# Patient Record
Sex: MALE | Race: WHITE | NOT HISPANIC OR LATINO | Employment: FULL TIME | URBAN - METROPOLITAN AREA
[De-identification: names, ages, dates, MRNs, and addresses within clinical notes are randomized per-mention and may not be internally consistent; named-entity substitution may affect disease eponyms.]

---

## 2018-09-19 ENCOUNTER — OFFICE VISIT (OUTPATIENT)
Dept: MEDICAL GROUP | Facility: PHYSICIAN GROUP | Age: 33
End: 2018-09-19
Payer: COMMERCIAL

## 2018-09-19 VITALS
DIASTOLIC BLOOD PRESSURE: 72 MMHG | OXYGEN SATURATION: 96 % | WEIGHT: 228.4 LBS | BODY MASS INDEX: 30.27 KG/M2 | HEART RATE: 85 BPM | TEMPERATURE: 97.3 F | HEIGHT: 73 IN | SYSTOLIC BLOOD PRESSURE: 112 MMHG | RESPIRATION RATE: 18 BRPM

## 2018-09-19 DIAGNOSIS — F17.220 CHEWING TOBACCO NICOTINE DEPENDENCE WITHOUT COMPLICATION: ICD-10-CM

## 2018-09-19 DIAGNOSIS — Z76.89 ESTABLISHING CARE WITH NEW DOCTOR, ENCOUNTER FOR: ICD-10-CM

## 2018-09-19 DIAGNOSIS — E66.9 OBESITY (BMI 30-39.9): ICD-10-CM

## 2018-09-19 PROCEDURE — 99203 OFFICE O/P NEW LOW 30 MIN: CPT | Performed by: NURSE PRACTITIONER

## 2018-09-19 ASSESSMENT — PATIENT HEALTH QUESTIONNAIRE - PHQ9: CLINICAL INTERPRETATION OF PHQ2 SCORE: 0

## 2018-09-19 NOTE — PROGRESS NOTES
Chief Complaint   Patient presents with   • Annual Exam        Farrukh Bryant is a 33 y.o. male here today to establish care and to discuss the evaluation and management of:    HPI:      Patient is here to establish care, he does not have a prior PCP.  Patient reports he is here as his wife wanted him to get labs done and be seen to establish care with a healthcare provider.  She is not here with him today.  He works for [employer].  He has 1 son, 6-month-old.      Chewing tobacco nicotine dependence without complication  This is a chronic problem.  Patient reports he has used chewing tobacco daily since he was about age 13 or 14 years.  He is not ready to quit.  He does see a dentist up to 4 times a year.  Denies any current sores or lesions in his mouth.      Current medicines (including changes today)  No current outpatient prescriptions on file.     No current facility-administered medications for this visit.        He  has a past medical history of Allergy.    He  has a past surgical history that includes knee arthroscopy.     Social History   Substance Use Topics   • Smoking status: Light Tobacco Smoker   • Smokeless tobacco: Current User     Types: Chew      Comment: very infrequent smoking, started chew age 13   • Alcohol use 0.6 - 1.2 oz/week     1 - 2 Cans of beer per week      Comment: 1-2 beers per day, sometimes more        Social History     Social History Narrative   • No narrative on file       Family History   Problem Relation Age of Onset   • No Known Problems Father        Family Status   Relation Status   • Mo (Not Specified)        unknown medical history   • Fa Alive       ROS   Constitutional: Denies fever, chills, or sweats  Eyes: negative for visual blurring, double vision, eye pain, floaters and discharge from eyes  ENT: negative for tinnitus, vertigo, frequent URI's, sinus trouble, persistent sore throat  Respiratory: negative for persistent cough, hemoptysis, dyspnea, wheezing  Cardiovascular:  "negative for palpitations, chest pain, or peripheral edema.  Gastrointestinal: negative for poor appetite, dysphagia, nausea, heartburn, abdominal pain, hemorrhoids, constipation or diarrhea  Genitourinary: negative for dysuria.   Musculoskeletal: negative for joint swelling and muscle pain/ soreness  Skin: negative for rash, scaling, hair or nail changes.  Neurologic: negative for migraine headaches, involuntary movements or tremor  Psychiatric: Positive for daily alcohol use, 1-2 beers daily.  Positive for MJ use. Negative for other illegal drug usage, sleep disturbance, anxiety, depression  Hematologic/Lymphatic/Immunologic: negative for unusual bruising, swollen glands  Endocrine: negative for temperature intolerance, polydipsia, polyuria, unintentional weight changes.        Objective:     Blood pressure 112/72, pulse 85, temperature 36.3 °C (97.3 °F), temperature source Temporal, resp. rate 18, height 1.854 m (6' 1\"), weight 103.6 kg (228 lb 6.4 oz), SpO2 96 %. Body mass index is 30.13 kg/m².    Physical Exam:   Constitutional: Alert, no distress.  Eye: Equal, round and reactive, conjunctiva clear, lids normal.  ENMT: Lips without lesions, good dentition, oropharynx clear. He does have chew in his mouth at time of exam. TM's normal without erythema, canals patent. Normal appearance of external nose, no drainage noted.   Neck: Trachea midline, no masses, no thyromegaly. No cervical or supraclavicular lymphadenopathy.   Respiratory: Unlabored respiratory effort, lungs clear to auscultation, no wheezes, no ronchi.  Cardiovascular: Normal S1, S2, no murmur, no edema. Capillary refill < 2 seconds in UE bilaterally.   Abdomen: Soft, non-tender, no masses, no hepatosplenomegaly. Normal bowel sounds.   Skin: Warm, dry, good turgor, no rashes in visible areas.  Neuro: DTR 2+ LE, CN II-XII grossly intact, normal sensation in extremities.   Psych: Alert and oriented x3, normal affect and mood.      Assessment and Plan: "   The following treatment plan was discussed    1. Establishing care with new doctor, encounter for    2. Chewing tobacco nicotine dependence without complication  Discussed with patient risks.  Patient is seeing a dentist currently.  Advised patient of resources if he would like to choose to quit chewing.  - CBC WITH DIFFERENTIAL; Future    3. Obesity (BMI 30-39.9)  Advised to complete fasting labs, will plan to go over those results at a future appointment.  - TSH; Future  - LIPID PROFILE; Future  - COMP METABOLIC PANEL; Future  - Patient identified as having weight management issue.  Appropriate orders and counseling given.      Reviewed risks and benefits of treatment plan. Patient verbally agrees to plan of care.     Records requested.    Followup: Return in about 3 weeks (around 10/10/2018) for Lab follow up, Annual Exam.    HANNAH Ya.     PLEASE NOTE: This dictation was created using voice recognition software. I have made every reasonable attempt to correct obvious errors, but I expect that there may be errors of grammar and possibly content that I did not discover prior finalizing this note.

## 2018-09-19 NOTE — ASSESSMENT & PLAN NOTE
This is a chronic problem.  Patient reports he has used chewing tobacco daily since he was about age 13 or 14 years.  He is not ready to quit.  He does see a dentist up to 4 times a year.  Denies any current sores or lesions in his mouth.

## 2018-09-19 NOTE — PATIENT INSTRUCTIONS
Your medical care was provided today by: CHELSEY Romeo    Thank You for the opportunity to serve you.    You may receive a brief survey in the mail shortly regarding your visit today. Please take a few moments to complete the survey and return it; no postage is necessary. We are working to serve our patient population better, improve customer service and our patients overall experience and your input can help us to accomplish this. We thank you for your help and for the opportunity to serve you today and in the future.     Special Instructions:  Always call 9-1-1 immediately if you develop a life threatening emergency.    Unless told otherwise please take all medications as directed and complete prescription therapies.     Watch for the following signs that require additional evaluation: progressive lethargy or unresponsiveness, localized pain (chest, abdomen), shortness of breath, painful breathing, progressive vomiting with weakness, bloody stools, or new rash.     If you are prescribed pain medication or any other medication that is sedating, do not take medication before or while operating a vehicle or heavy machinery or equipment due to potential side effects such as drowsiness and/or dizziness.

## 2023-06-29 ENCOUNTER — OFFICE VISIT (OUTPATIENT)
Dept: MEDICAL GROUP | Facility: PHYSICIAN GROUP | Age: 38
End: 2023-06-29
Payer: COMMERCIAL

## 2023-06-29 VITALS
HEART RATE: 72 BPM | HEIGHT: 73 IN | BODY MASS INDEX: 29.57 KG/M2 | RESPIRATION RATE: 12 BRPM | WEIGHT: 223.11 LBS | DIASTOLIC BLOOD PRESSURE: 78 MMHG | TEMPERATURE: 96.8 F | OXYGEN SATURATION: 98 % | SYSTOLIC BLOOD PRESSURE: 110 MMHG

## 2023-06-29 DIAGNOSIS — F17.220 CHEWING TOBACCO NICOTINE DEPENDENCE WITHOUT COMPLICATION: ICD-10-CM

## 2023-06-29 DIAGNOSIS — H61.22 IMPACTED CERUMEN OF LEFT EAR: ICD-10-CM

## 2023-06-29 DIAGNOSIS — Z00.00 HEALTHCARE MAINTENANCE: ICD-10-CM

## 2023-06-29 DIAGNOSIS — R68.82 DECREASED LIBIDO: ICD-10-CM

## 2023-06-29 DIAGNOSIS — E66.3 OVERWEIGHT (BMI 25.0-29.9): ICD-10-CM

## 2023-06-29 DIAGNOSIS — Z11.59 NEED FOR HEPATITIS C SCREENING TEST: ICD-10-CM

## 2023-06-29 PROCEDURE — 99385 PREV VISIT NEW AGE 18-39: CPT | Performed by: STUDENT IN AN ORGANIZED HEALTH CARE EDUCATION/TRAINING PROGRAM

## 2023-06-29 PROCEDURE — 3074F SYST BP LT 130 MM HG: CPT | Performed by: STUDENT IN AN ORGANIZED HEALTH CARE EDUCATION/TRAINING PROGRAM

## 2023-06-29 PROCEDURE — 3078F DIAST BP <80 MM HG: CPT | Performed by: STUDENT IN AN ORGANIZED HEALTH CARE EDUCATION/TRAINING PROGRAM

## 2023-06-29 PROCEDURE — 99213 OFFICE O/P EST LOW 20 MIN: CPT | Mod: 25 | Performed by: STUDENT IN AN ORGANIZED HEALTH CARE EDUCATION/TRAINING PROGRAM

## 2023-06-29 ASSESSMENT — PATIENT HEALTH QUESTIONNAIRE - PHQ9: CLINICAL INTERPRETATION OF PHQ2 SCORE: 0

## 2023-06-29 ASSESSMENT — ENCOUNTER SYMPTOMS
NERVOUS/ANXIOUS: 0
DEPRESSION: 0
PALPITATIONS: 0
ABDOMINAL PAIN: 0
DIZZINESS: 0
HEARTBURN: 0
FEVER: 0
ORTHOPNEA: 0
INSOMNIA: 0
HEADACHES: 0
CHILLS: 0
SHORTNESS OF BREATH: 0
WHEEZING: 0
FOCAL WEAKNESS: 0
COUGH: 0

## 2023-06-29 NOTE — PROGRESS NOTES
Subjective:   HISTORY OF THE PRESENT ILLNESS: Patient is a 37 y.o. male here today to establish care.       Problem   Decreased Libido    Decreased sex drive last couple months.   Reports he is also fatigue.   Denies depression or anxiety.     Reports drinks about 20 cans of beer in a week, occasional marijuana. Cutting down on chew tobacco.   Denies cold intolerance.     He works for [employer].   Has good sleep.      Impacted Cerumen of Left Ear   Chewing Tobacco Nicotine Dependence Without Complication        No current [facility]-ordered outpatient medications on file.     No current [facility]-ordered facility-administered medications on file.       Review of systems:  Review of Systems   Constitutional:  Negative for chills and fever.   Respiratory:  Negative for cough, shortness of breath and wheezing.    Cardiovascular:  Negative for chest pain, palpitations, orthopnea and leg swelling.   Gastrointestinal:  Negative for abdominal pain, heartburn and melena.   Genitourinary:  Negative for dysuria and frequency.   Musculoskeletal:  Negative for joint pain.   Neurological:  Negative for dizziness, focal weakness and headaches.   Psychiatric/Behavioral:  Negative for depression and suicidal ideas. The patient is not nervous/anxious and does not have insomnia.          Past Medical History:   Diagnosis Date    Allergy      Past Surgical History:   Procedure Laterality Date    KNEE ARTHROSCOPY      right ACL      Social History     Tobacco Use    Smoking status: Former     Types: Cigarettes    Smokeless tobacco: Current     Types: Chew    Tobacco comments:     very infrequent smoking, started chew age 13   Vaping Use    Vaping Use: Never used   Substance Use Topics    Alcohol use: Yes     Alcohol/week: 0.6 - 1.2 oz     Types: 1 - 2 Cans of beer per week     Comment: 1-2 beers per day, sometimes more     Drug use: Yes     Types: Marijuana     Comment: weekly      Family History   Problem Relation Age of Onset    No Known Problems  "Mother     No Known Problems Father      No current outpatient medications on file.     No current facility-administered medications for this visit.       Allergies:  No Known Allergies    Allergies, past medical history, past surgical history, family history, social history reviewed and updated.    Objective:    /78 (BP Location: Left arm, Patient Position: Sitting, BP Cuff Size: Adult)   Pulse 72   Temp 36 °C (96.8 °F) (Temporal)   Resp 12   Ht 1.854 m (6' 1\")   Wt 101 kg (223 lb 1.7 oz)   SpO2 98%   BMI 29.44 kg/m²    Body mass index is 29.44 kg/m².    Physical exam:  General: Normal appearance, no acute distress, not ill-appearing  HEENT: EOM intact, conjunctiva normal limits, negative right/left eye discharge.  Sclera anicteric, right TM within normal limits, left TM cerumen impaction  Cardiovascular: Normal rate and rhythm, no murmurs  Pulmonary: No respiratory distress, no wheezing, no rales, breath sounds normal.  Musculoskeletal: No edema bilaterally  Skin: Warm, dry, no lesions  Neurological: No focal deficits, normal gait  Psychiatric: Mood within normal limits    Assessment/Plan:    Patient here for a preventive medicine visit today and to establish care.  -Reviewed all past medical history, family history, social history    -Diet and exercise appropriate counseling given  -Social determinants of health reviewed  -Tobacco, alcohol, recreational drug use: chew tobacco  -Occupation: [employer]  -Cholesterol screening: ordered  -Diabetes screening: ordered    Immunizations/Infectious disease:  STI screening:declines  Safe sex practices discusssed  HIV screening: declines   Immunizations: declines pna    Cancer screenings:  Colorectal cancer screening: no fam hx of colon cancer     Problem List Items Addressed This Visit       Chewing tobacco nicotine dependence without complication     Cutting down on his own  Advised to rtc if needing nicotine patches  Advised to follow up with dentist twice per " year         Decreased libido     TSH, testosterone ordered  Advised to keep a healthy diet with fresh fruits vegetables, meats.  Stay away from alcohol/beer consumption.  Exercise daily including resistance training.    Follow-up 2 months         Relevant Orders    VITAMIN D,25 HYDROXY (DEFICIENCY)    TSH WITH REFLEX TO FT4    TESTOSTERONE SERUM    Impacted cerumen of left ear     Left-sided cerumen impaction irrigated today by medical assistant.  Patient tolerated procedure well, TM visualized after irrigation within normal limits.          Other Visit Diagnoses       Healthcare maintenance        Relevant Orders    Comp Metabolic Panel    CBC WITHOUT DIFFERENTIAL    Need for hepatitis C screening test        Relevant Orders    HEP C VIRUS ANTIBODY    Overweight (BMI 25.0-29.9)        Relevant Orders    HEMOGLOBIN A1C           Patient Counseling:  --Discussed moderation in caloric intake, sufficient fresh fruits/vegetables, fiber, iron  --Discussed brushing, flossing, and dental visits.   --Encouraged regular exercise.   --Discussed tobacco, alcohol, or other drug use.  --Discussed sexually transmitted infections, partner selection, use of condoms, avoidance of unintended pregnancy and contraceptive alternatives.  --Injury prevention: Discussed safety belts, safety helmets, smoke detector, etc.      Return in about 2 months (around 8/29/2023) for symptoms.

## 2023-06-29 NOTE — ASSESSMENT & PLAN NOTE
TSH, testosterone ordered  Advised to keep a healthy diet with fresh fruits vegetables, meats.  Stay away from alcohol/beer consumption.  Exercise daily including resistance training.    Follow-up 2 months

## 2023-06-29 NOTE — ASSESSMENT & PLAN NOTE
Cutting down on his own  Advised to rtc if needing nicotine patches  Advised to follow up with dentist twice per year

## 2023-06-29 NOTE — ASSESSMENT & PLAN NOTE
Left-sided cerumen impaction irrigated today by medical assistant.  Patient tolerated procedure well, TM visualized after irrigation within normal limits.

## 2023-07-03 LAB — HBA1C MFR BLD: 5.3 % (ref ?–5.8)

## 2023-07-05 ENCOUNTER — TELEPHONE (OUTPATIENT)
Dept: MEDICAL GROUP | Facility: PHYSICIAN GROUP | Age: 38
End: 2023-07-05
Payer: COMMERCIAL

## 2023-07-05 NOTE — TELEPHONE ENCOUNTER
Phone Number Called: [phone]    Call outcome: Spoke to patient regarding message below.    Message:Labs reviewed today. Testosterone levels are normal however Vitamin D levels are very low. This could be contributing to your symptoms. I recommend taking 2000units of Vitamin D daily that you can get over the counter. One of your liver enzymes was slightly elevated I think we should check this again in 6m to 1 year along with vitamin D levels, in the meantime I would cut back on alcohol.      Any questions please let me know or schedule an appt.      Fe Sidhu DO

## 2023-07-05 NOTE — TELEPHONE ENCOUNTER
----- Message from Carrie Sidhu D.O. sent at 7/5/2023  7:39 AM PDT -----  Labs reviewed today. Testosterone levels are normal however Vitamin D levels are very low. This could be contributing to your symptoms. I recommend taking 2000units of Vitamin D daily that you can get over the counter. One of your liver enzymes was slightly elevated I think we should check this again in 6m to 1 year along with vitamin D levels, in the meantime I would cut back on alcohol.     Any questions please let me know or schedule an appt.     Fe Sidhu DO

## 2023-09-20 ENCOUNTER — OFFICE VISIT (OUTPATIENT)
Dept: MEDICAL GROUP | Facility: PHYSICIAN GROUP | Age: 38
End: 2023-09-20
Payer: COMMERCIAL

## 2023-09-20 VITALS
OXYGEN SATURATION: 95 % | HEIGHT: 73 IN | SYSTOLIC BLOOD PRESSURE: 108 MMHG | DIASTOLIC BLOOD PRESSURE: 74 MMHG | WEIGHT: 229.28 LBS | BODY MASS INDEX: 30.39 KG/M2 | HEART RATE: 60 BPM | RESPIRATION RATE: 12 BRPM | TEMPERATURE: 97 F

## 2023-09-20 DIAGNOSIS — Z78.9 HEAVY DRINKER OF ALCOHOL: ICD-10-CM

## 2023-09-20 DIAGNOSIS — R74.8 ELEVATED LIVER ENZYMES: ICD-10-CM

## 2023-09-20 DIAGNOSIS — F17.220 CHEWING TOBACCO NICOTINE DEPENDENCE WITHOUT COMPLICATION: ICD-10-CM

## 2023-09-20 PROBLEM — R68.82 DECREASED LIBIDO: Status: RESOLVED | Noted: 2023-06-29 | Resolved: 2023-09-20

## 2023-09-20 PROBLEM — H61.22 IMPACTED CERUMEN OF LEFT EAR: Status: RESOLVED | Noted: 2023-06-29 | Resolved: 2023-09-20

## 2023-09-20 PROCEDURE — 99213 OFFICE O/P EST LOW 20 MIN: CPT | Performed by: STUDENT IN AN ORGANIZED HEALTH CARE EDUCATION/TRAINING PROGRAM

## 2023-09-20 PROCEDURE — 3074F SYST BP LT 130 MM HG: CPT | Performed by: STUDENT IN AN ORGANIZED HEALTH CARE EDUCATION/TRAINING PROGRAM

## 2023-09-20 PROCEDURE — 3078F DIAST BP <80 MM HG: CPT | Performed by: STUDENT IN AN ORGANIZED HEALTH CARE EDUCATION/TRAINING PROGRAM

## 2023-09-20 ASSESSMENT — ENCOUNTER SYMPTOMS
FEVER: 0
HEADACHES: 0
ABDOMINAL PAIN: 0
ORTHOPNEA: 0
DIZZINESS: 0
WHEEZING: 0
COUGH: 0
SHORTNESS OF BREATH: 0
CHILLS: 0
PALPITATIONS: 0
FOCAL WEAKNESS: 0

## 2023-09-20 NOTE — PROGRESS NOTES
HISTORY OF PRESENT ILLNESS: Farrukh is a pleasant 38 y.o. male, established patient who presents today to discuss medical problems as listed below:    Problem   Elevated Liver Enzymes   Heavy Drinker of Alcohol    Patient reports a history of heavy drinking.  Reports that within the last 2 months he has cut back significantly since then.  Reports his symptoms of low libido has improved as well     Chewing Tobacco Nicotine Dependence Without Complication   Decreased Libido (Resolved)    Decreased sex drive last couple months.   Reports he is also fatigue.   Denies depression or anxiety.     Reports drinks about 20 cans of beer in a week, occasional marijuana. Cutting down on chew tobacco.   Denies cold intolerance.     He works for [employer].   Has good sleep.      Impacted Cerumen of Left Ear (Resolved)        No current [facility]-ordered outpatient medications on file.     No current [facility]-ordered facility-administered medications on file.       Review of systems:  Review of Systems   Constitutional:  Negative for chills and fever.   Respiratory:  Negative for cough, shortness of breath and wheezing.    Cardiovascular:  Negative for chest pain, palpitations, orthopnea and leg swelling.   Gastrointestinal:  Negative for abdominal pain and melena.   Genitourinary:  Negative for dysuria.   Musculoskeletal:  Negative for joint pain.   Neurological:  Negative for dizziness, focal weakness and headaches.         Patient Active Problem List    Diagnosis Date Noted    Elevated liver enzymes 09/20/2023    Heavy drinker of alcohol 09/20/2023    Chewing tobacco nicotine dependence without complication 09/19/2018    Obesity (BMI 30-39.9) 09/19/2018     Past Surgical History:   Procedure Laterality Date    KNEE ARTHROSCOPY      right ACL      Social History     Tobacco Use    Smoking status: Former     Types: Cigarettes    Smokeless tobacco: Current     Types: Chew    Tobacco comments:     very infrequent smoking, started chew age 13  "  Vaping Use    Vaping Use: Never used   Substance Use Topics    Alcohol use: Yes     Alcohol/week: 0.6 - 1.2 oz     Types: 1 - 2 Cans of beer per week     Comment: 1-2 beers per day, sometimes more     Drug use: Yes     Types: Marijuana     Comment: weekly      Family History   Problem Relation Age of Onset    No Known Problems Mother     No Known Problems Father      No current outpatient medications on file.     No current facility-administered medications for this visit.       Allergies:  No Known Allergies    Allergies, past medical history, past surgical history, family history, social history reviewed and updated.    Objective:    /74   Pulse 60   Temp 36.1 °C (97 °F) (Temporal)   Resp 12   Ht 1.854 m (6' 1\")   Wt 104 kg (229 lb 4.5 oz)   SpO2 95%   BMI 30.25 kg/m²    Body mass index is 30.25 kg/m².    Physical exam:  General: Normal appearance, no acute distress, not ill-appearing  HEENT: EOM intact, conjunctiva normal limits, negative right/left eye discharge.  Sclera anicteric  Cardiovascular: Normal rate and rhythm, no murmurs  Pulmonary: No respiratory distress, no wheezing, no rales, breath sounds normal.  Abdomen: Bowel sounds normal, flat, soft.  Musculoskeletal: No edema bilaterally  Skin: Warm, dry, no lesions  Neurological: No focal deficits, normal gait  Psychiatric: Mood within normal limits    Assessment/Plan:    Problem List Items Addressed This Visit       Chewing tobacco nicotine dependence without complication     Counseled on cessation         Elevated liver enzymes    Relevant Orders    US-RUQ    Heavy drinker of alcohol     Elevated AST on last set of labs were we will order a right upper quadrant ultrasound to assess.               Return in about 1 year (around 9/20/2024).   "

## 2023-09-20 NOTE — ASSESSMENT & PLAN NOTE
Elevated AST on last set of labs were we will order a right upper quadrant ultrasound to assess.

## 2024-06-28 LAB — HBA1C MFR BLD: 5.4 % (ref ?–5.8)

## 2024-07-06 DIAGNOSIS — R79.89 LOW TESTOSTERONE IN MALE: ICD-10-CM

## 2024-07-25 ENCOUNTER — APPOINTMENT (OUTPATIENT)
Dept: MEDICAL GROUP | Facility: PHYSICIAN GROUP | Age: 39
End: 2024-07-25
Payer: COMMERCIAL

## 2024-07-25 VITALS
HEIGHT: 73 IN | RESPIRATION RATE: 16 BRPM | TEMPERATURE: 96.8 F | HEART RATE: 60 BPM | OXYGEN SATURATION: 97 % | DIASTOLIC BLOOD PRESSURE: 84 MMHG | SYSTOLIC BLOOD PRESSURE: 118 MMHG | BODY MASS INDEX: 31.26 KG/M2 | WEIGHT: 235.89 LBS

## 2024-07-25 DIAGNOSIS — R74.8 ELEVATED LIVER ENZYMES: ICD-10-CM

## 2024-07-25 DIAGNOSIS — E55.9 VITAMIN D DEFICIENCY: ICD-10-CM

## 2024-07-25 DIAGNOSIS — E66.9 OBESITY (BMI 30-39.9): ICD-10-CM

## 2024-07-25 DIAGNOSIS — Z30.09 VASECTOMY EVALUATION: ICD-10-CM

## 2024-07-25 DIAGNOSIS — R79.89 LOW TESTOSTERONE IN MALE: ICD-10-CM

## 2024-07-25 DIAGNOSIS — F17.220 CHEWING TOBACCO NICOTINE DEPENDENCE WITHOUT COMPLICATION: ICD-10-CM

## 2024-07-25 DIAGNOSIS — R29.818 SUSPECTED SLEEP APNEA: ICD-10-CM

## 2024-07-25 DIAGNOSIS — R53.83 OTHER FATIGUE: ICD-10-CM

## 2024-07-25 PROCEDURE — 99214 OFFICE O/P EST MOD 30 MIN: CPT | Performed by: STUDENT IN AN ORGANIZED HEALTH CARE EDUCATION/TRAINING PROGRAM

## 2024-07-25 PROCEDURE — 3074F SYST BP LT 130 MM HG: CPT | Performed by: STUDENT IN AN ORGANIZED HEALTH CARE EDUCATION/TRAINING PROGRAM

## 2024-07-25 PROCEDURE — 3079F DIAST BP 80-89 MM HG: CPT | Performed by: STUDENT IN AN ORGANIZED HEALTH CARE EDUCATION/TRAINING PROGRAM

## 2024-07-25 ASSESSMENT — PATIENT HEALTH QUESTIONNAIRE - PHQ9: CLINICAL INTERPRETATION OF PHQ2 SCORE: 0

## 2024-08-16 ENCOUNTER — TELEPHONE (OUTPATIENT)
Dept: HEALTH INFORMATION MANAGEMENT | Facility: OTHER | Age: 39
End: 2024-08-16
Payer: COMMERCIAL

## 2024-09-06 ENCOUNTER — OFFICE VISIT (OUTPATIENT)
Dept: UROLOGY | Facility: MEDICAL CENTER | Age: 39
End: 2024-09-06
Payer: COMMERCIAL

## 2024-09-06 VITALS — BODY MASS INDEX: 31.14 KG/M2 | HEIGHT: 73 IN | WEIGHT: 235 LBS

## 2024-09-06 DIAGNOSIS — Z30.09 VASECTOMY EVALUATION: ICD-10-CM

## 2024-09-06 PROCEDURE — 99203 OFFICE O/P NEW LOW 30 MIN: CPT | Performed by: STUDENT IN AN ORGANIZED HEALTH CARE EDUCATION/TRAINING PROGRAM

## 2024-09-06 RX ORDER — DIAZEPAM 10 MG
10 TABLET ORAL
Qty: 1 TABLET | Refills: 0 | Status: SHIPPED | OUTPATIENT
Start: 2024-09-06 | End: 2024-09-06

## 2024-09-06 NOTE — PROGRESS NOTES
Subjective  Farrukh Bryant is a 39 y.o. male who presents today for evaluation of vasectomy.    He does have children. He has discussed this at length with his partner and he would like to proceed with vasectomy as a permanent form of contraception. He does not have  history of prior  history or surgeries. He is not on anticoagulation.  He does not have a history of a bleeding disorder. He does not have scrotal/testicular pain.      Family History   Problem Relation Age of Onset    No Known Problems Mother     No Known Problems Father        Social History     Socioeconomic History    Marital status:      Spouse name: Not on file    Number of children: Not on file    Years of education: Not on file    Highest education level: Not on file   Occupational History    Not on file   Tobacco Use    Smoking status: Former     Types: Cigarettes     Passive exposure: Never    Smokeless tobacco: Current     Types: Chew    Tobacco comments:     very infrequent smoking, started chew age 13   Vaping Use    Vaping status: Never Used   Substance and Sexual Activity    Alcohol use: Yes     Alcohol/week: 0.6 - 1.2 oz     Types: 1 - 2 Cans of beer per week     Comment: 1-2 beers per day, sometimes more     Drug use: Yes     Types: Marijuana     Comment: weekly    Sexual activity: Yes     Partners: Female     Birth control/protection: I.U.D.     Comment:     Other Topics Concern    Not on file   Social History Narrative    Not on file     Social Determinants of Health     Financial Resource Strain: Not on file   Food Insecurity: Not on file   Transportation Needs: Not on file   Physical Activity: Not on file   Stress: Not on file   Social Connections: Not on file   Intimate Partner Violence: Not on file   Housing Stability: Not on file       Past Surgical History:   Procedure Laterality Date    KNEE ARTHROSCOPY      right ACL        Past Medical History:   Diagnosis Date    Allergy        Current Outpatient Medications  "  Medication Sig Dispense Refill    diazepam (VALIUM) 10 MG tablet Take 1 Tablet by mouth 1 (one) time if needed for Anxiety for up to 1 dose. 1 Tablet 0     No current facility-administered medications for this visit.       No Known Allergies    Objective  Ht 1.854 m (6' 1\")   Wt 107 kg (235 lb)   Physical Exam  Constitutional:       Appearance: Normal appearance.   HENT:      Head: Normocephalic and atraumatic.   Eyes:      Extraocular Movements: Extraocular movements intact.      Conjunctiva/sclera: Conjunctivae normal.   Pulmonary:      Effort: No respiratory distress.   Genitourinary:     Comments: Vas easily palpable bilaterally, testes wnl  Musculoskeletal:      Cervical back: Normal range of motion.   Skin:     General: Skin is warm and dry.   Neurological:      General: No focal deficit present.      Mental Status: He is alert.   Psychiatric:         Mood and Affect: Mood normal.         Labs:   none    Imaging:     none    Assessment    Vasectomy was discussed in detail including that this is a form a permanent sterilization/contraception, which has potential complications of bleeding, infection, pain, post-vasectomy pain syndrome, and recanalization.  A vasectomy packet was given to the patient and he was given oral and written instructions regarding the need to have a negative post-vasectomy semen analysis prior to being declared \"infertile\", and the need for continued alternative forms of birth control until that time.      Plan    Problem List Items Addressed This Visit       Vasectomy evaluation    Relevant Medications    diazepam (VALIUM) 10 MG tablet     RTC for vasectomy, diazepam sent  "

## 2024-11-15 ENCOUNTER — APPOINTMENT (OUTPATIENT)
Dept: UROLOGY | Facility: MEDICAL CENTER | Age: 39
End: 2024-11-15
Payer: COMMERCIAL

## 2024-11-15 DIAGNOSIS — Z30.2 ENCOUNTER FOR VASECTOMY: ICD-10-CM

## 2024-11-15 PROCEDURE — 55250 REMOVAL OF SPERM DUCT(S): CPT | Performed by: STUDENT IN AN ORGANIZED HEALTH CARE EDUCATION/TRAINING PROGRAM

## 2024-11-15 RX ADMIN — Medication 20 ML: at 12:11

## 2024-11-15 NOTE — PROGRESS NOTES
Subjective  Farrukh Bryant is a 39 y.o. male who presents today for vasectomy. He understands this is considered a permanent form of sterilization/contraception and wishes to proceed.    Family History   Problem Relation Age of Onset    No Known Problems Mother     No Known Problems Father        Social History     Socioeconomic History    Marital status:      Spouse name: Not on file    Number of children: Not on file    Years of education: Not on file    Highest education level: Not on file   Occupational History    Not on file   Tobacco Use    Smoking status: Former     Types: Cigarettes     Passive exposure: Never    Smokeless tobacco: Current     Types: Chew    Tobacco comments:     very infrequent smoking, started chew age 13   Vaping Use    Vaping status: Never Used   Substance and Sexual Activity    Alcohol use: Yes     Alcohol/week: 0.6 - 1.2 oz     Types: 1 - 2 Cans of beer per week     Comment: 1-2 beers per day, sometimes more     Drug use: Yes     Types: Marijuana     Comment: weekly    Sexual activity: Yes     Partners: Female     Birth control/protection: I.U.D.     Comment:     Other Topics Concern    Not on file   Social History Narrative    Not on file     Social Drivers of Health     Financial Resource Strain: Not on file   Food Insecurity: Not on file   Transportation Needs: Not on file   Physical Activity: Not on file   Stress: Not on file   Social Connections: Not on file   Intimate Partner Violence: Not on file   Housing Stability: Not on file       Past Surgical History:   Procedure Laterality Date    KNEE ARTHROSCOPY      right ACL        Past Medical History:   Diagnosis Date    Allergy        No current outpatient medications on file.     No current facility-administered medications for this visit.       No Known Allergies    Objective  There were no vitals taken for this visit.  Physical Exam  Constitutional:       Appearance: Normal appearance.   HENT:      Head:  Normocephalic and atraumatic.   Eyes:      Extraocular Movements: Extraocular movements intact.      Conjunctiva/sclera: Conjunctivae normal.   Pulmonary:      Effort: No respiratory distress.   Musculoskeletal:      Cervical back: Normal range of motion.   Skin:     General: Skin is warm and dry.   Neurological:      General: No focal deficit present.      Mental Status: He is alert.   Psychiatric:         Mood and Affect: Mood normal.         Labs:     A1C  Lab Results   Component Value Date/Time    HBA1C 5.4 06/28/2024 0000           Procedure    SURGEON: Dr. Siomara Nicholas    PREOPERATIVE DIAGNOSIS: Vasectomy status.     POSTOPERATIVE DIAGNOSIS: Vasectomy status.     PROCEDURE PERFORMED: Vasectomy.     ANESTHESIA: 1% Lidocaine plain, 10 mg Diazepam    BLOOD LOSS: 0cc    SPECIMENS REMOVED: None.     TUBES, LINES, AND DRAINS: None.     PROCEDURE FINDINGS:     1. Successful vasectomy.     DESCRIPTION OF PROCEDURE: Informed consent was obtained. The patient was taken back to the procedure room and placed on the table in a supine position. The patient was then prepped and draped in the usual sterile fashion. I identified the right vas deferens. A cord block was performed  with 1% Lidocaine. I then isolated the right vas deferens and injected 5 mL of 1% lidocaine plain subcutaneously and deep towards the vas deferens itself. I then used the scalpel to make a 4 mm incision over the vas deferens. I then used the sharp vasectomy dissector to bluntly dissect on either side of the vas until it was mobilized enough to grasp with the ring clamp. I then brought this up through the scrotal incision and used the vasectomy dissectors to clear off the fascial tissue. Once the vas was appropriately skeletonized, I then clamped the midpoint of the vas deferens with the vasectomy dissector. The proximal end of the vas was clipped, cut, and cauterized. The proximal end was released. I then clipped the intervening dartos tissue to  stagger the proximal and distal ends. The distal end was then clipped, cut and cauterized. A 0.5 cm segment of the vas deferens was removed. No active bleeding was seen. I then turned my attention to the left side and repeated the procedure as stated above. I then evaluated both sides for any signs of bleeding and none was seen. I then used a 3-0 chromic suture to close the skin incisions with a horizontal mattress suture. This concluded the procedure, the patient tolerated it well.      Assessment: Vasectomy Post-Procedure     Advil (Ibuprofen) and Tylenol (Acetaminophen) as needed for pain control every 6 hours. It is recommended that you alternate between these medications every 3 hours for the first 24 hours.     Apply ice as needed, 10 minutes on and 10 minutes off, with a barrier between your skin and the ice. Continue this for the first 48 hours     You may shower after 48 hours     Supportive underwear (briefs or boxer briefs, no boxers)     No lifting > 15 pound for 10 days     No sexual activity for 10 days     No strenuous activity or straddle activities (bicycles, motorcycles, riding , etc) for 10 days     You will provide a semen sample in 3 months for semen analysis, you will be called with the result. You will need to bring this to the lab within one hour of collection.      Plan    1. Encounter for vasectomy    Other orders  - lidocaine (Xylocaine) 1 % injection 20 mL      RTC prn